# Patient Record
Sex: FEMALE
[De-identification: names, ages, dates, MRNs, and addresses within clinical notes are randomized per-mention and may not be internally consistent; named-entity substitution may affect disease eponyms.]

---

## 2020-03-26 PROBLEM — Z00.00 ENCOUNTER FOR PREVENTIVE HEALTH EXAMINATION: Status: ACTIVE | Noted: 2020-03-26

## 2020-03-27 ENCOUNTER — APPOINTMENT (OUTPATIENT)
Dept: NEUROSURGERY | Facility: CLINIC | Age: 65
End: 2020-03-27
Payer: MEDICARE

## 2020-03-27 DIAGNOSIS — E78.5 HYPERLIPIDEMIA, UNSPECIFIED: ICD-10-CM

## 2020-03-27 DIAGNOSIS — G93.9 DISORDER OF BRAIN, UNSPECIFIED: ICD-10-CM

## 2020-03-27 PROCEDURE — 99202 OFFICE O/P NEW SF 15 MIN: CPT

## 2020-03-27 RX ORDER — GUAIFENESIN 600 MG/1
TABLET, EXTENDED RELEASE ORAL
Refills: 0 | Status: ACTIVE | COMMUNITY

## 2020-03-27 RX ORDER — UBIDECARENONE/VIT E ACET 100MG-5
CAPSULE ORAL
Refills: 0 | Status: ACTIVE | COMMUNITY

## 2020-03-27 RX ORDER — ATORVASTATIN CALCIUM 80 MG/1
TABLET, FILM COATED ORAL
Refills: 0 | Status: ACTIVE | COMMUNITY

## 2020-04-03 NOTE — REASON FOR VISIT
[New Patient Visit] : a new patient visit [Referred By: _________] : Patient was referred by CIELO [FreeTextEntry1] : Brain Lesion

## 2020-04-03 NOTE — HISTORY OF PRESENT ILLNESS
[de-identified] : 64 year old woman with past medical history   referred by Dr. Nicole Merida for consideration of [facility] for brain metastasis.\par \par From referring physician's notes:\par \par Ms. Mir presented to PCP office with worsening imbalance over the past several months. CT head demonstrated a pontine mass with extension into the LEFT cerebeluum and compressing the fourth ventricle. She subsequently obtained MRI brain on 2/27/2020, which demonstrated a lesion in the anterior ambrose and medulla extending into the left cerebellar peduncle. This was initially felt to be radiographically consistent with glioma, given its atypical radiographic appearance. Plan initially was to biopsy this lesion. However, CT chest/abdomen/pelvis done during workup demonstrated an infiltrative mass in the right paratracheal mediastinum. The patient underwent EBUS with Dr. 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 Liquid biopsy was sent for molecular testing and still pending. PET/CT was ordered and results pending.\par The patient also started 2 mg decadron twice a day and MRI brain was repeated on 3/20/2020 to evaluate stability of pontine lesion. \par \par The patient also reports LEFT facial weakness over the past several months and  changes in speech particularly noting that her "voice feels weaker." Additionally, she also reports dry cough, decreased appetite and unspecified amount of weight loss over the past several months.

## 2020-04-03 NOTE — ADDENDUM
[FreeTextEntry1] : 2020 \par  \par OFFICE CONSULTATION NOTE \par  \par  \par Re:	Katherine Mir  \par :	51 \par MRN:  [id number] \par  \par Ms. Mir is a 64-year-old woman who presents for consultation regarding possible brain metastasis. She has previously been diagnosed with a paratracheal mass which biopsied and showing carcinoma was thought to be lung origin. Due to symptoms including facial weakness and imbalance, she underwent brain MRI which disclosed a left, pontomedullary lesion. This lesion is partially exophytic and enhancing internally in some areas. It measures approximately 2 x 2.5 cm. The radiologists have interpreted the lesion to possibly be inflammatory, demyelinating, or intrinsic brainstem tumor, or metastatic tumor. Based on the perfusion MRI the radiologists have a lower suspicion for metastasis, however this has not been completely ruled out. \par  \par I discussed these findings with Ms. Mir. I indicated that further workup will be necessary prior to consideration of treatment. I discussed that she will need to see a neurologist for workup of neuroinflammatory and demyelinating disease. I suspect that this workup will include a spinal tap. I would recommend that in addition to those studies, that CSF cytology be sent. Our radiologists have also recommended that a brain MRI with spectroscopy be performed to help to narrow the diagnostic possibilities. Certainly if after further workup she is felt to have a metastatic brain lesion, it would be amenable to Gamma Knife radiosurgery, likely in 3 fractions. However, at this time the diagnosis is not clear, and we will await further workup prior to making a decision regarding whether to proceed with radiosurgery. \par  \par  \par  \par  \par  \par  \par This consultation was performed via teleconferencing. \par  \par  \par  \par  \par Yifan Gamboa M.D. \par  of Neurosurgery \par [facility] \[facility] Department of Neurosurgery \par [facility] \par [address] \par [facility], Third Floor \[place] [address] \par Tel: [phone] \par Email:  [email or web address] \par  \par Dictated but not read. \par  \par JE/rlp [facility] #[id number] \par